# Patient Record
Sex: FEMALE | Race: WHITE | NOT HISPANIC OR LATINO | Employment: PART TIME | URBAN - METROPOLITAN AREA
[De-identification: names, ages, dates, MRNs, and addresses within clinical notes are randomized per-mention and may not be internally consistent; named-entity substitution may affect disease eponyms.]

---

## 2023-09-08 DIAGNOSIS — M25.561 RIGHT KNEE PAIN: Primary | ICD-10-CM

## 2023-09-12 ENCOUNTER — OFFICE VISIT (OUTPATIENT)
Dept: SPORTS MEDICINE | Facility: CLINIC | Age: 63
End: 2023-09-12
Payer: COMMERCIAL

## 2023-09-12 ENCOUNTER — HOSPITAL ENCOUNTER (OUTPATIENT)
Dept: RADIOLOGY | Facility: HOSPITAL | Age: 63
Discharge: HOME | End: 2023-09-12
Attending: ORTHOPAEDIC SURGERY
Payer: COMMERCIAL

## 2023-09-12 VITALS
WEIGHT: 141 LBS | BODY MASS INDEX: 26.62 KG/M2 | DIASTOLIC BLOOD PRESSURE: 92 MMHG | HEART RATE: 76 BPM | HEIGHT: 61 IN | SYSTOLIC BLOOD PRESSURE: 134 MMHG

## 2023-09-12 DIAGNOSIS — M17.11 ARTHRITIS OF RIGHT KNEE: Primary | ICD-10-CM

## 2023-09-12 DIAGNOSIS — M25.561 RIGHT KNEE PAIN, UNSPECIFIED CHRONICITY: ICD-10-CM

## 2023-09-12 PROCEDURE — 99203 PR OFFICE/OUTPT VISIT, NEW, LEVL III, 30-44 MIN: ICD-10-PCS | Mod: 25,S$GLB,, | Performed by: ORTHOPAEDIC SURGERY

## 2023-09-12 PROCEDURE — 20610 DRAIN/INJ JOINT/BURSA W/O US: CPT | Mod: RT,S$GLB,, | Performed by: ORTHOPAEDIC SURGERY

## 2023-09-12 PROCEDURE — 99203 OFFICE O/P NEW LOW 30 MIN: CPT | Mod: 25,S$GLB,, | Performed by: ORTHOPAEDIC SURGERY

## 2023-09-12 PROCEDURE — 99999 PR PBB SHADOW E&M-EST. PATIENT-LVL IV: CPT | Mod: PBBFAC,,, | Performed by: ORTHOPAEDIC SURGERY

## 2023-09-12 PROCEDURE — 20610 LARGE JOINT ASPIRATION/INJECTION: R KNEE: ICD-10-PCS | Mod: RT,S$GLB,, | Performed by: ORTHOPAEDIC SURGERY

## 2023-09-12 PROCEDURE — 73564 X-RAY EXAM KNEE 4 OR MORE: CPT | Mod: TC,50

## 2023-09-12 PROCEDURE — 99999 PR PBB SHADOW E&M-EST. PATIENT-LVL IV: ICD-10-PCS | Mod: PBBFAC,,, | Performed by: ORTHOPAEDIC SURGERY

## 2023-09-12 PROCEDURE — 73564 XR KNEE ORTHO BILAT WITH FLEXION: ICD-10-PCS | Mod: 26,50,, | Performed by: RADIOLOGY

## 2023-09-12 PROCEDURE — 73564 X-RAY EXAM KNEE 4 OR MORE: CPT | Mod: 26,50,, | Performed by: RADIOLOGY

## 2023-09-12 RX ORDER — NAPROXEN 500 MG/1
TABLET ORAL
COMMUNITY
Start: 2023-08-31

## 2023-09-12 RX ORDER — GABAPENTIN 300 MG/1
300 CAPSULE ORAL 2 TIMES DAILY
COMMUNITY
Start: 2023-08-21

## 2023-09-12 RX ORDER — CITALOPRAM 20 MG/1
20 TABLET, FILM COATED ORAL
COMMUNITY
Start: 2023-08-14

## 2023-09-12 RX ORDER — ACETAMINOPHEN AND CODEINE PHOSPHATE 300; 30 MG/1; MG/1
TABLET ORAL
COMMUNITY
Start: 2023-08-31

## 2023-09-12 RX ORDER — TRIAMCINOLONE ACETONIDE 40 MG/ML
60 INJECTION, SUSPENSION INTRA-ARTICULAR; INTRAMUSCULAR
Status: DISCONTINUED | OUTPATIENT
Start: 2023-09-12 | End: 2023-09-12 | Stop reason: HOSPADM

## 2023-09-12 RX ORDER — LOSARTAN POTASSIUM AND HYDROCHLOROTHIAZIDE 12.5; 1 MG/1; MG/1
1 TABLET ORAL
COMMUNITY
Start: 2023-06-29

## 2023-09-12 RX ADMIN — TRIAMCINOLONE ACETONIDE 60 MG: 40 INJECTION, SUSPENSION INTRA-ARTICULAR; INTRAMUSCULAR at 09:09

## 2023-09-12 NOTE — PROCEDURES
Large Joint Aspiration/Injection: R knee    Date/Time: 9/12/2023 9:15 AM    Performed by: Eliu Mackenzie MD  Authorized by: Eliu Mackenzie MD    Consent Done?:  Yes (Verbal)  Indications:  Pain  Site marked: the procedure site was marked    Timeout: prior to procedure the correct patient, procedure, and site was verified    Prep: patient was prepped and draped in usual sterile fashion      Details:  Needle Size:  22 G  Ultrasonic Guidance for needle placement?: No    Approach:  Superior  Location:  Knee  Site:  R knee  Medications:  60 mg triamcinolone acetonide 40 mg/mL  Patient tolerance:  Patient tolerated the procedure well with no immediate complications

## 2023-09-12 NOTE — PROGRESS NOTES
CC: Right knee pain    63 y.o. Female with a 6 years history of right knee atraumatic pain.  Patient is retired.   She states that the pain is severe and not responding to any conservative care.      She reports that the pain and weakness. It also bothers her at night. Pain with getting in and out of car.     No mechanical symptoms, No instability    Is affecting ADLs.  Pain is 7/10 at it's worst.    REVIEW OF SYSTEMS:   Constitution: Negative. Negative for chills, fever and night sweats.   HENT: Negative for congestion and headaches.    Eyes: Negative for blurred vision, left vision loss and right vision loss.   Cardiovascular: Negative for chest pain and syncope.   Respiratory: Negative for cough and shortness of breath.    Endocrine: Negative for polydipsia, polyphagia and polyuria.   Hematologic/Lymphatic: Negative for bleeding problem. Does not bruise/bleed easily.   Skin: Negative for dry skin, itching and rash.   Musculoskeletal: Negative for falls. Positive for right knee pain and  muscle weakness.   Gastrointestinal: Negative for abdominal pain and bowel incontinence.   Genitourinary: Negative for bladder incontinence and nocturia.   Neurological: Negative for disturbances in coordination, loss of balance and seizures.   Psychiatric/Behavioral: Negative for depression. The patient does not have insomnia.    Allergic/Immunologic: Negative for hives and persistent infections.     PAST MEDICAL HISTORY:   No past medical history on file.    PAST SURGICAL HISTORY:   No past surgical history on file.    FAMILY HISTORY:   No family history on file.    SOCIAL HISTORY:   Social History     Socioeconomic History    Marital status:    Tobacco Use    Smoking status: Every Day     Types: Cigarettes    Smokeless tobacco: Never       MEDICATIONS:     Current Outpatient Medications:     acetaminophen-codeine 300-30mg (TYLENOL #3) 300-30 mg Tab, , Disp: , Rfl:     citalopram (CELEXA) 20 MG tablet, Take 20 mg by  "mouth., Disp: , Rfl:     gabapentin (NEURONTIN) 300 MG capsule, Take 300 mg by mouth 2 (two) times daily., Disp: , Rfl:     losartan-hydrochlorothiazide 100-12.5 mg (HYZAAR) 100-12.5 mg Tab, Take 1 tablet by mouth., Disp: , Rfl:     naproxen (NAPROSYN) 500 MG tablet, , Disp: , Rfl:     ALLERGIES:   Review of patient's allergies indicates:  No Known Allergies    VITAL SIGNS:   BP (!) 134/92   Pulse 76   Ht 5' 1" (1.549 m)   Wt 64 kg (141 lb)   BMI 26.64 kg/m²      PHYSICAL EXAMINATION:  General:  The patient is alert and oriented x 3.  Mood is pleasant.  Observation of ears, eyes and nose reveal no gross abnormalities.  No labored breathing observed.    RIGHT KNEE EXAMINATION     OBSERVATION / INSPECTION   Gait:   Nonantalgic   Alignment:  Neutral   Scars:   None   Muscle atrophy: Mild  Effusion:  Mild  Warmth:  None   Discoloration:   none     TENDERNESS / CREPITUS (T / C):          T / C      T / C   Patella   - / -   Lateral joint line   - / -   Peripatellar medial  -  Medial joint line    + / -   Peripatellar lateral -  Medial plica   - / -   Patellar tendon -   Popliteal fossa  + / -   Quad tendon   -   Gastrocnemius   -   Prepatellar Bursa - / -   Quadricep   -   Tibial tubercle  -  Thigh/hamstring  -   Pes anserine/HS -  Fibula    -   ITB   - / -  Tibia     -   Tib/fib joint  - / -  LCL    -     MFC   - / -   MCL: Proximal  -    LFC   - / -    Distal   -          ROM: (* = pain)  PASSIVE   ACTIVE    Left :   5 / 0 / 145   5 / 0 / 145     Right :    5 / 0 / 135   5 / 0 / 120    Patellofemoral examination:  See above noted areas of tenderness.   Patella position    Subluxation / dislocation: Centered           Sup. / Inf;   Normal   Crepitus (PF):    Absent   Patellar Mobility:       Medial-lateral:   Normal    Superior-inferior:  Normal    Inferior tilt   Normal    Patellar tendon:  Normal   Lateral tilt:    Normal   J-sign:     None   Patellofemoral grind:   No pain       MENISCAL SIGNS:     Pain on " terminal extension:  -  Pain on terminal flexion:  +  Luisitos maneuver:  -   Squat     Not performed    LIGAMENT EXAMINATION:  ACL / Lachman:  normal (-1 to 2mm)    PCL-Post.  drawer: normal 0 to 2mm  MCL- Valgus:  normal 0 to 2mm  LCL- Varus:  normal 0 to 2mm  Pivot shift: normal (Equal)   Dial Test: difference c/w other side   At 30° flexion: normal (< 5°)    At 90° flexion: normal (< 5°)   Reverse Pivot Shift:   normal (Equal)     STRENGTH: (* = with pain) PAINFUL SIDE   Quadricep   5/5   Hamstrin/5    EXTREMITY NEURO-VASCULAR EXAMINATION:   Sensation:  Grossly intact to light touch all dermatomal regions.   Motor Function:  Fully intact motor function at hip, knee, foot and ankle    DTRs;  quadriceps and  achilles 2+.  No clonus and downgoing Babinski.    Vascular status:  DP and PT pulses 2+, brisk capillary refill, symmetric.     OTHER FINDINGS:  None    IMAGING:    X-rays including standing, weight bearing AP and flexion bilateral knees, lateral and merchant views ordered and images reviewed by me show:    No fracture, dislocation or other pathology   Medial compartment: Grade 3 changes   Lateral compartment: no degenerative changes   Patellofemoral compartment: Advanced lateral facet DJD   Varus producing arthritic changes     ASSESSMENT:    Right Knee Pain, arthritis  1. Right knee pain, unspecified chronicity    2. Arthritis of right knee        PLAN:   1. CSI R knee  2. Medial  brace  3. Tylenol  4. Referral for PT       All questions were answered, pt will contact us for questions or concerns in the interim.

## 2023-09-13 ENCOUNTER — TELEPHONE (OUTPATIENT)
Dept: SPORTS MEDICINE | Facility: CLINIC | Age: 63
End: 2023-09-13
Payer: COMMERCIAL

## 2023-09-13 NOTE — TELEPHONE ENCOUNTER
Returned patient call and LVM.     ----- Message from Heydi Fraga sent at 9/13/2023  8:27 AM CDT -----  Regarding: Walker questions  Contact: @ [phone]  Pt is wanting to know if the provider can give her a walker so that she can use it so that she doesn't fall when she's walking. Pt is asking to speak with someone today. Pt states that she is hard of hearing and is requesting a detailed vm letting her know. Please call pt to discuss further

## 2023-09-20 ENCOUNTER — TELEPHONE (OUTPATIENT)
Dept: SPORTS MEDICINE | Facility: CLINIC | Age: 63
End: 2023-09-20
Payer: COMMERCIAL

## 2023-09-20 NOTE — TELEPHONE ENCOUNTER
Patient will come get a walker/crutches for a short period usage.       ----- Message from Marisa Kaba sent at 9/20/2023 10:54 AM CDT -----  Pt calling in regards to wanting crutches, reclining bed and a walker , please call , leave  detailed voicemail if no answer pt doesn't hear well             Confirmed patient's contact info below:  Contact Name: Reji Mcfadden  Phone Number: [phone]  '

## 2023-09-21 DIAGNOSIS — M17.11 ARTHRITIS OF RIGHT KNEE: Primary | ICD-10-CM

## 2023-09-21 RX ORDER — DICLOFENAC SODIUM 10 MG/G
2 GEL TOPICAL 2 TIMES DAILY
Qty: 20 G | Refills: 1 | Status: SHIPPED | OUTPATIENT
Start: 2023-09-21

## 2023-09-21 NOTE — TELEPHONE ENCOUNTER
Voltaren gel presciption     ----- Message from Karlee Crum sent at 9/21/2023  8:12 AM CDT -----  Regarding: advise / rx inquiry  Contact: PT @ [phone]  Pt is calling asking to speak with someone in the office. Pt is asking about getting some medication or injections for her bone on bone arthritis; pt is also asking if acupuncture is a good way to go.    Pt states that she is in a lot of pain and looking for something to help her. Pt is asking if she needs to get in contact with her PCP or if Dr. Mackenzie is able to do injections for her. Please call to advise. Thanks.       [facility] [address] [place] [address], [place] - [address] [facility]  [address] [facility] [address]  Phone: [phone] Fax: [phone]

## 2023-12-07 ENCOUNTER — TELEPHONE (OUTPATIENT)
Dept: SPORTS MEDICINE | Facility: CLINIC | Age: 63
End: 2023-12-07
Payer: COMMERCIAL

## 2023-12-07 NOTE — TELEPHONE ENCOUNTER
----- Message from Marisa Kaba sent at 12/7/2023 12:45 PM CST -----  Regarding: injection  Pt calling to schedule her right   knee injection appt , please call     Confirmed patient's contact info below:  Contact Name: Reji Mcfadden  Phone Number: [phone]

## 2023-12-12 ENCOUNTER — OFFICE VISIT (OUTPATIENT)
Dept: SPORTS MEDICINE | Facility: CLINIC | Age: 63
End: 2023-12-12
Payer: COMMERCIAL

## 2023-12-12 VITALS
HEART RATE: 75 BPM | WEIGHT: 142.19 LBS | BODY MASS INDEX: 26.85 KG/M2 | HEIGHT: 61 IN | SYSTOLIC BLOOD PRESSURE: 125 MMHG | DIASTOLIC BLOOD PRESSURE: 85 MMHG

## 2023-12-12 DIAGNOSIS — M17.11 ARTHRITIS OF RIGHT KNEE: Primary | ICD-10-CM

## 2023-12-12 PROCEDURE — 99999 PR PBB SHADOW E&M-EST. PATIENT-LVL III: CPT | Mod: PBBFAC,,, | Performed by: ORTHOPAEDIC SURGERY

## 2023-12-12 PROCEDURE — 20610 DRAIN/INJ JOINT/BURSA W/O US: CPT | Mod: RT,S$GLB,, | Performed by: ORTHOPAEDIC SURGERY

## 2023-12-12 PROCEDURE — 99999 PR PBB SHADOW E&M-EST. PATIENT-LVL III: ICD-10-PCS | Mod: PBBFAC,,, | Performed by: ORTHOPAEDIC SURGERY

## 2023-12-12 PROCEDURE — 20610 LARGE JOINT ASPIRATION/INJECTION: R KNEE: ICD-10-PCS | Mod: RT,S$GLB,, | Performed by: ORTHOPAEDIC SURGERY

## 2023-12-12 PROCEDURE — 99214 PR OFFICE/OUTPT VISIT, EST, LEVL IV, 30-39 MIN: ICD-10-PCS | Mod: 25,S$GLB,, | Performed by: ORTHOPAEDIC SURGERY

## 2023-12-12 PROCEDURE — 99214 OFFICE O/P EST MOD 30 MIN: CPT | Mod: 25,S$GLB,, | Performed by: ORTHOPAEDIC SURGERY

## 2023-12-12 RX ORDER — TRIAMCINOLONE ACETONIDE 40 MG/ML
60 INJECTION, SUSPENSION INTRA-ARTICULAR; INTRAMUSCULAR
Status: DISCONTINUED | OUTPATIENT
Start: 2023-12-12 | End: 2023-12-12 | Stop reason: HOSPADM

## 2023-12-12 RX ADMIN — TRIAMCINOLONE ACETONIDE 60 MG: 40 INJECTION, SUSPENSION INTRA-ARTICULAR; INTRAMUSCULAR at 10:12

## 2023-12-12 NOTE — PROCEDURES
Large Joint Aspiration/Injection: R knee    Date/Time: 12/12/2023 10:15 AM    Performed by: Eliu Mackenzie MD  Authorized by: Eliu Mackenzie MD    Consent Done?:  Yes (Verbal)  Indications:  Pain  Site marked: the procedure site was marked    Timeout: prior to procedure the correct patient, procedure, and site was verified    Prep: patient was prepped and draped in usual sterile fashion      Details:  Needle Size:  22 G  Ultrasonic Guidance for needle placement?: No    Approach:  Superior  Location:  Knee  Site:  R knee  Medications:  60 mg triamcinolone acetonide 40 mg/mL  Aspirate amount (mL):  10  Aspirate:  Serous  Patient tolerance:  Patient tolerated the procedure well with no immediate complications

## 2023-12-12 NOTE — PROGRESS NOTES
CC: Right knee pain  Patient returns to clinic for follow up of right knee. She is requesting a repeat CSI today and notes moderate relief from the last injection.   No change in history since last visit. Patient has also been using the medial  brace.       Initial Hx:   63 y.o. Female with a 6 years history of right knee atraumatic pain.  Patient is retired.   She states that the pain is severe and not responding to any conservative care.      She reports that the pain and weakness. It also bothers her at night. Pain with getting in and out of car.     No mechanical symptoms, No instability    Is affecting ADLs.  Pain is 7/10 at it's worst.    REVIEW OF SYSTEMS:   Constitution: Negative. Negative for chills, fever and night sweats.   HENT: Negative for congestion and headaches.    Eyes: Negative for blurred vision, left vision loss and right vision loss.   Cardiovascular: Negative for chest pain and syncope.   Respiratory: Negative for cough and shortness of breath.    Endocrine: Negative for polydipsia, polyphagia and polyuria.   Hematologic/Lymphatic: Negative for bleeding problem. Does not bruise/bleed easily.   Skin: Negative for dry skin, itching and rash.   Musculoskeletal: Negative for falls. Positive for right knee pain and  muscle weakness.   Gastrointestinal: Negative for abdominal pain and bowel incontinence.   Genitourinary: Negative for bladder incontinence and nocturia.   Neurological: Negative for disturbances in coordination, loss of balance and seizures.   Psychiatric/Behavioral: Negative for depression. The patient does not have insomnia.    Allergic/Immunologic: Negative for hives and persistent infections.     PAST MEDICAL HISTORY:   History reviewed. No pertinent past medical history.    PAST SURGICAL HISTORY:   History reviewed. No pertinent surgical history.    FAMILY HISTORY:   History reviewed. No pertinent family history.    SOCIAL HISTORY:   Social History     Socioeconomic  "History    Marital status:    Tobacco Use    Smoking status: Every Day     Types: Cigarettes    Smokeless tobacco: Never       MEDICATIONS:     Current Outpatient Medications:     acetaminophen-codeine 300-30mg (TYLENOL #3) 300-30 mg Tab, , Disp: , Rfl:     citalopram (CELEXA) 20 MG tablet, Take 20 mg by mouth., Disp: , Rfl:     diclofenac sodium (VOLTAREN) 1 % Gel, Apply 2 g topically 2 (two) times daily., Disp: 20 g, Rfl: 1    gabapentin (NEURONTIN) 300 MG capsule, Take 300 mg by mouth 2 (two) times daily., Disp: , Rfl:     losartan-hydrochlorothiazide 100-12.5 mg (HYZAAR) 100-12.5 mg Tab, Take 1 tablet by mouth., Disp: , Rfl:     naproxen (NAPROSYN) 500 MG tablet, , Disp: , Rfl:     ALLERGIES:   Review of patient's allergies indicates:  No Known Allergies    VITAL SIGNS:   /85   Pulse 75   Ht 5' 1" (1.549 m)   Wt 64.5 kg (142 lb 3.2 oz)   BMI 26.87 kg/m²      PHYSICAL EXAMINATION:  General:  The patient is alert and oriented x 3.  Mood is pleasant.  Observation of ears, eyes and nose reveal no gross abnormalities.  No labored breathing observed.    RIGHT KNEE EXAMINATION     OBSERVATION / INSPECTION   Gait:   Nonantalgic   Alignment:  Neutral   Scars:   None   Muscle atrophy: Mild  Effusion:  Mild  Warmth:  None   Discoloration:   none     TENDERNESS / CREPITUS (T / C):          T / C      T / C   Patella   - / -   Lateral joint line   - / -   Peripatellar medial  -  Medial joint line    + / -   Peripatellar lateral -  Medial plica   - / -   Patellar tendon -   Popliteal fossa  + / -   Quad tendon   -   Gastrocnemius   -   Prepatellar Bursa - / -   Quadricep   -   Tibial tubercle  -  Thigh/hamstring  -   Pes anserine/HS -  Fibula    -   ITB   - / -  Tibia     -   Tib/fib joint  - / -  LCL    -     MFC   - / -   MCL: Proximal  -    LFC   - / -    Distal   -          ROM: (* = pain)  PASSIVE   ACTIVE    Left :   5 / 0 / 145   5 / 0 / 145     Right :    5 / 0 / 135   5 / 0 / 120    Patellofemoral " examination:  See above noted areas of tenderness.   Patella position    Subluxation / dislocation: Centered           Sup. / Inf;   Normal   Crepitus (PF):    Absent   Patellar Mobility:       Medial-lateral:   Normal    Superior-inferior:  Normal    Inferior tilt   Normal    Patellar tendon:  Normal   Lateral tilt:    Normal   J-sign:     None   Patellofemoral grind:   No pain       MENISCAL SIGNS:     Pain on terminal extension:  -  Pain on terminal flexion:  +  Luisitos maneuver:  -   Squat     Not performed    LIGAMENT EXAMINATION:  ACL / Lachman:  normal (-1 to 2mm)    PCL-Post.  drawer: normal 0 to 2mm  MCL- Valgus:  normal 0 to 2mm  LCL- Varus:  normal 0 to 2mm  Pivot shift: normal (Equal)   Dial Test: difference c/w other side   At 30° flexion: normal (< 5°)    At 90° flexion: normal (< 5°)   Reverse Pivot Shift:   normal (Equal)     STRENGTH: (* = with pain) PAINFUL SIDE   Quadricep   5/5   Hamstrin/5    EXTREMITY NEURO-VASCULAR EXAMINATION:   Sensation:  Grossly intact to light touch all dermatomal regions.   Motor Function:  Fully intact motor function at hip, knee, foot and ankle    DTRs;  quadriceps and  achilles 2+.  No clonus and downgoing Babinski.    Vascular status:  DP and PT pulses 2+, brisk capillary refill, symmetric.     OTHER FINDINGS:  None    IMAGING:    X-rays including standing, weight bearing AP and flexion bilateral knees, lateral and merchant views ordered and images reviewed by me show:    No fracture, dislocation or other pathology   Medial compartment: Grade 3 changes   Lateral compartment: no degenerative changes   Patellofemoral compartment: Advanced lateral facet DJD   Varus producing arthritic changes     ASSESSMENT:    Right Knee Pain, arthritis  1. Arthritis of right knee        PLAN:   1. CSI R knee + 10 cc collected   2. F/u PRN       All questions were answered, pt will contact us for questions or concerns in the interim.

## 2024-03-08 ENCOUNTER — TELEPHONE (OUTPATIENT)
Dept: SPORTS MEDICINE | Facility: CLINIC | Age: 64
End: 2024-03-08
Payer: COMMERCIAL

## 2024-03-08 NOTE — TELEPHONE ENCOUNTER
LVM appt scheduled for 3/12/24 at 330----- Message from Davey Rodriguez sent at 3/8/2024  1:51 PM CST -----  Regarding: FW: Appt  Contact: Pt  [phone]  Can you call her and schedule her for a CSI?     ----- Message -----  From: Davey Rodriguez  Sent: 3/8/2024   1:03 PM CST  To: Davey Rodriguez  Subject: FW: Appt                                           ----- Message -----  From: Joel Mendoza  Sent: 3/8/2024   9:24 AM CST  To: Gurdeep TURNER Staff  Subject: Appt                                             Pt calling regarding appt for right knee pain. Would like to discuss injections and schedule appt. Attempted to schedule. Please call  [phone]

## 2024-03-13 NOTE — PROGRESS NOTES
CC: Right knee pain    Patient returns to clinic today for follow-up evaluation of right knee pain/osteoarthritis.  Last seen by Dr. Mackenzie approximately 3 months ago for the same issue.  She has received corticosteroid injections in the past which typically help with pain.  Her knee pain is typically worse with getting in and out of a car, going up and down stairs, and bending the right knee. She reports increased swelling and pain that began 2-3 weeks ago.  No inciting injury or trauma to the right knee.  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  She continues to wear a medial  brace.  Most of her pain is localized to the medial aspect of the right knee.  Requesting a corticosteroid injection today to help with pain.    No mechanical symptoms, No instability    Is affecting ADLs.  Pain is 7/10 at it's worst.    REVIEW OF SYSTEMS:   Constitution: Negative. Negative for chills, fever and night sweats.   HENT: Negative for congestion and headaches.    Eyes: Negative for blurred vision, left vision loss and right vision loss.   Cardiovascular: Negative for chest pain and syncope.   Respiratory: Negative for cough and shortness of breath.    Endocrine: Negative for polydipsia, polyphagia and polyuria.   Hematologic/Lymphatic: Negative for bleeding problem. Does not bruise/bleed easily.   Skin: Negative for dry skin, itching and rash.   Musculoskeletal: Negative for falls. Positive for right knee pain and  muscle weakness.   Gastrointestinal: Negative for abdominal pain and bowel incontinence.   Genitourinary: Negative for bladder incontinence and nocturia.   Neurological: Negative for disturbances in coordination, loss of balance and seizures.   Psychiatric/Behavioral: Negative for depression. The patient does not have insomnia.    Allergic/Immunologic: Negative for hives and persistent infections.     PAST MEDICAL HISTORY:   History reviewed. No  "pertinent past medical history.    PAST SURGICAL HISTORY:   History reviewed. No pertinent surgical history.    FAMILY HISTORY:   History reviewed. No pertinent family history.    SOCIAL HISTORY:   Social History     Socioeconomic History    Marital status:    Tobacco Use    Smoking status: Every Day     Types: Cigarettes    Smokeless tobacco: Never       MEDICATIONS:     Current Outpatient Medications:     acetaminophen-codeine 300-30mg (TYLENOL #3) 300-30 mg Tab, , Disp: , Rfl:     citalopram (CELEXA) 20 MG tablet, Take 20 mg by mouth., Disp: , Rfl:     diclofenac sodium (VOLTAREN) 1 % Gel, Apply 2 g topically 2 (two) times daily., Disp: 20 g, Rfl: 1    gabapentin (NEURONTIN) 300 MG capsule, Take 300 mg by mouth 2 (two) times daily., Disp: , Rfl:     losartan-hydrochlorothiazide 100-12.5 mg (HYZAAR) 100-12.5 mg Tab, Take 1 tablet by mouth., Disp: , Rfl:     naproxen (NAPROSYN) 500 MG tablet, , Disp: , Rfl:     ALLERGIES:   Review of patient's allergies indicates:  No Known Allergies    VITAL SIGNS:   BP (!) 145/77   Pulse 84   Ht 5' 1" (1.549 m)   Wt 59 kg (130 lb 1.1 oz)   BMI 24.58 kg/m²      PHYSICAL EXAMINATION:  General:  The patient is alert and oriented x 3.  Mood is pleasant.  Observation of ears, eyes and nose reveal no gross abnormalities.  No labored breathing observed.    RIGHT KNEE EXAMINATION     OBSERVATION / INSPECTION   Gait:   Nonantalgic   Alignment:  Neutral   Scars:   None   Muscle atrophy: Mild  Effusion:  Mild  Warmth:  None   Discoloration:   none     TENDERNESS / CREPITUS (T / C):          T / C      T / C   Patella   - / -   Lateral joint line   - / -   Peripatellar medial  -  Medial joint line    + / -   Peripatellar lateral -  Medial plica   - / -   Patellar tendon -   Popliteal fossa   + / -   Quad tendon   -   Gastrocnemius   -   Prepatellar Bursa - / -   Quadricep   -   Tibial tubercle  -  Thigh/hamstring  -   Pes anserine/HS -  Fibula    -   ITB   - / -  Tibia "     -   Tib/fib joint  - / -  LCL    -     MFC   - / -   MCL: Proximal  -    LFC   - / -    Distal   -          ROM: (* = pain)  PASSIVE   ACTIVE    Left :   5 / 0 / 145   5 / 0 / 145     Right :    5 / 5* / 125*   5 / 5* / 120*    Patellofemoral examination:  See above noted areas of tenderness.   Patella position    Subluxation / dislocation: Centered           Sup. / Inf;   Normal   Crepitus (PF):    +  Patellar Mobility:       Medial-lateral:   Normal    Superior-inferior:  Normal    Inferior tilt   Normal    Patellar tendon:  Normal   Lateral tilt:    Normal   J-sign:     None   Patellofemoral grind:   No pain       MENISCAL SIGNS:     Pain on terminal extension:  +  Pain on terminal flexion:  +  Luisitos maneuver:  -   Squat     Not performed    LIGAMENT EXAMINATION:  ACL / Lachman:  normal (-1 to 2mm)    PCL-Post.  drawer: normal 0 to 2mm  MCL- Valgus:  normal 0 to 2mm  LCL- Varus:  normal 0 to 2mm  Pivot shift: normal (Equal)   Dial Test: difference c/w other side   At 30° flexion: normal (< 5°)    At 90° flexion: normal (< 5°)   Reverse Pivot Shift:   normal (Equal)     STRENGTH: (* = with pain) PAINFUL SIDE   Quadricep   4+/5   Hamstrin+/5    EXTREMITY NEURO-VASCULAR EXAMINATION:   Sensation:  Grossly intact to light touch all dermatomal regions.   Motor Function:  Fully intact motor function at hip, knee, foot and ankle    DTRs;  quadriceps and  achilles 2+.  No clonus and downgoing Babinski.    Vascular status:  DP and PT pulses 2+, brisk capillary refill, symmetric.     OTHER FINDINGS:  None    X-rays bilateral knees (2023):    X-rays including standing, weight bearing AP and flexion bilateral knees, lateral and merchant views ordered and images reviewed by me show:   No acute fracture or dislocation.  Moderate to severe medial tibiofemoral and patellofemoral DJD.  Soft tissues appear normal.    Kellgren Elvis grade 3     ASSESSMENT:    Chronic right knee pain  Primary  osteoarthritis of right knee      PLAN:     Prior imaging reviewed and discussed with patient in detail.    We discussed at length different treatment options including conservative vs surgical management. These include anti-inflammatories, acetaminophen, rest, ice, heat, formal physical therapy including strengthening and stretching exercises, home exercise programs, dry needling, corticosteroid versus viscosupplementation injections, and finally surgical intervention.      10 cc of normal joint fluid aspirated from the right knee followed by therapeutic corticosteroid injection performed today.  See procedure note for details.    Recommend she continue to rest, ice, and elevate the right knee and take over-the-counter anti-inflammatories / acetaminophen as needed for pain.  Continue wearing  brace.    Follow-up in 3-4 months.      All questions were answered, pt will contact us for questions or concerns in the interim.        Medical Dictation software was used during the dictation of portions or the entirety of this medical record.  Phonetic or grammatic errors may exist due to the use of this software. For clarification, refer to the author of the document.

## 2024-03-14 ENCOUNTER — OFFICE VISIT (OUTPATIENT)
Dept: SPORTS MEDICINE | Facility: CLINIC | Age: 64
End: 2024-03-14
Payer: COMMERCIAL

## 2024-03-14 VITALS
HEART RATE: 84 BPM | DIASTOLIC BLOOD PRESSURE: 77 MMHG | BODY MASS INDEX: 24.55 KG/M2 | SYSTOLIC BLOOD PRESSURE: 145 MMHG | WEIGHT: 130.06 LBS | HEIGHT: 61 IN

## 2024-03-14 DIAGNOSIS — M17.11 PRIMARY OSTEOARTHRITIS OF RIGHT KNEE: Primary | ICD-10-CM

## 2024-03-14 DIAGNOSIS — M25.561 CHRONIC PAIN OF RIGHT KNEE: ICD-10-CM

## 2024-03-14 DIAGNOSIS — G89.29 CHRONIC PAIN OF RIGHT KNEE: ICD-10-CM

## 2024-03-14 PROCEDURE — 99999 PR PBB SHADOW E&M-EST. PATIENT-LVL III: CPT | Mod: PBBFAC,,, | Performed by: PHYSICIAN ASSISTANT

## 2024-03-14 PROCEDURE — 99214 OFFICE O/P EST MOD 30 MIN: CPT | Mod: 25,S$GLB,, | Performed by: PHYSICIAN ASSISTANT

## 2024-03-14 PROCEDURE — 20610 DRAIN/INJ JOINT/BURSA W/O US: CPT | Mod: RT,S$GLB,, | Performed by: PHYSICIAN ASSISTANT

## 2024-03-14 RX ORDER — TRIAMCINOLONE ACETONIDE 40 MG/ML
40 INJECTION, SUSPENSION INTRA-ARTICULAR; INTRAMUSCULAR
Status: DISCONTINUED | OUTPATIENT
Start: 2024-03-14 | End: 2024-03-14 | Stop reason: HOSPADM

## 2024-03-14 RX ADMIN — TRIAMCINOLONE ACETONIDE 40 MG: 40 INJECTION, SUSPENSION INTRA-ARTICULAR; INTRAMUSCULAR at 09:03

## 2024-03-14 NOTE — PROCEDURES
Large Joint Aspiration/Injection: R knee    Date/Time: 3/14/2024 9:00 AM    Performed by: Marco Metz PA-C  Authorized by: Marco Metz PA-C    Consent Done?:  Yes (Verbal)  Indications:  Joint swelling  Site marked: the procedure site was marked    Timeout: prior to procedure the correct patient, procedure, and site was verified    Prep: patient was prepped and draped in usual sterile fashion    Local anesthesia used?: No      Details:  Needle Size:  22 G  Ultrasonic Guidance for needle placement?: No    Approach:  Superior  Location:  Knee  Site:  R knee  Medications:  40 mg triamcinolone acetonide 40 mg/mL  Aspirate amount (mL):  10  Aspirate:  Serous  Patient tolerance:  Patient tolerated the procedure well with no immediate complications    Aspiration/Injection Procedure    A time out was performed, including verification of patient ID, procedure, site and side, availability of information and equipment, review of safety issues, and agreement with consent, the procedure site was marked.    Aspiration:  After time out was performed, the patient was prepped aseptically with povidone-iodine swabsticks. 10cc's of normal joint fluid was aspirated from the Superolateral  aspect of the right Knee Joint using a 22g x 1.5 needle in sterile fashion without complication.     Injection:  Following aspiration, a diagnostic and therapeutic injection of 1:3cc Kenalog/Marcaine was given under sterile technique in the supine position.     Reji Mcfadden had no adverse reactions to the medication. Pain decreased. She was instructed to apply ice to the joint for 20 minutes and avoid strenuous activities for 24-36 hours following the injection. She was warned of possible blood sugar and/or blood pressure changes during that time. Following that time, she can resume regular activities.

## 2024-04-16 ENCOUNTER — TELEPHONE (OUTPATIENT)
Dept: SPORTS MEDICINE | Facility: CLINIC | Age: 64
End: 2024-04-16
Payer: COMMERCIAL

## 2024-04-16 DIAGNOSIS — M21.161 ACQUIRED GENU VARUM OF RIGHT LOWER EXTREMITY: ICD-10-CM

## 2024-04-16 DIAGNOSIS — M17.11 PRIMARY OSTEOARTHRITIS OF RIGHT KNEE: Primary | ICD-10-CM

## 2024-04-16 NOTE — TELEPHONE ENCOUNTER
Called and spoke with patient to let her know that a order was put in and that someone will her once it's ready to be .    ----- Message from Marco Metz PA-C sent at 4/16/2024  9:55 AM CDT -----  Regarding: FW: pt advice  Contact: pt [phone]  Can you call her and let her know we put in another order for her knee brace and we will call her when it is ready for pickup?  ----- Message -----  From: Ángela Mendoza  Sent: 4/16/2024   9:30 AM CDT  To: [facility] Staff  Subject: pt advice                                        Pt calling to request a prescription /order for another knee brace. Pls call

## 2024-05-29 ENCOUNTER — TELEPHONE (OUTPATIENT)
Dept: SPORTS MEDICINE | Facility: CLINIC | Age: 64
End: 2024-05-29
Payer: COMMERCIAL

## 2024-05-29 NOTE — TELEPHONE ENCOUNTER
Spoke with pt, pt states she is unable to walk and right knee is very painful. Pt has been scheduled to see Dr Mackenzie on 5/30/24

## 2024-05-29 NOTE — TELEPHONE ENCOUNTER
----- Message from Marisa Kaba sent at 5/29/2024 10:23 AM CDT -----  Pt calling to schedule  Gel injection lina for knee, says she has fluid in her knee     Confirmed patient's contact info below:  Contact Name: Reji Mcfadden  Phone Number: [phone]'

## 2024-06-05 ENCOUNTER — TELEPHONE (OUTPATIENT)
Dept: SPORTS MEDICINE | Facility: CLINIC | Age: 64
End: 2024-06-05
Payer: COMMERCIAL

## 2024-06-05 DIAGNOSIS — M17.11 PRIMARY OSTEOARTHRITIS OF RIGHT KNEE: Primary | ICD-10-CM

## 2024-06-05 NOTE — TELEPHONE ENCOUNTER
Visco Injection series order.     ----- Message from Luis Ceja MA sent at 6/5/2024  9:55 AM CDT -----  Regarding: appt request  Contact: pt at  [phone]  Pt is calling to speak with someone in provider office is asking for a return call regarding an appt  for a  gel shot in her  right  knee please call pt at  [phone]

## 2024-06-19 ENCOUNTER — TELEPHONE (OUTPATIENT)
Dept: SPORTS MEDICINE | Facility: CLINIC | Age: 64
End: 2024-06-19
Payer: COMMERCIAL

## 2024-06-19 DIAGNOSIS — M17.11 PRIMARY OSTEOARTHRITIS OF RIGHT KNEE: Primary | ICD-10-CM

## 2024-06-26 ENCOUNTER — TELEPHONE (OUTPATIENT)
Dept: SPORTS MEDICINE | Facility: CLINIC | Age: 64
End: 2024-06-26
Payer: COMMERCIAL

## 2024-06-27 ENCOUNTER — OFFICE VISIT (OUTPATIENT)
Dept: SPORTS MEDICINE | Facility: CLINIC | Age: 64
End: 2024-06-27
Payer: COMMERCIAL

## 2024-06-27 ENCOUNTER — TELEPHONE (OUTPATIENT)
Dept: SPORTS MEDICINE | Facility: CLINIC | Age: 64
End: 2024-06-27

## 2024-06-27 VITALS
SYSTOLIC BLOOD PRESSURE: 131 MMHG | BODY MASS INDEX: 25.3 KG/M2 | WEIGHT: 134 LBS | DIASTOLIC BLOOD PRESSURE: 78 MMHG | HEART RATE: 81 BPM | HEIGHT: 61 IN

## 2024-06-27 DIAGNOSIS — M17.11 PRIMARY OSTEOARTHRITIS OF RIGHT KNEE: Primary | ICD-10-CM

## 2024-06-27 PROCEDURE — 99999 PR PBB SHADOW E&M-EST. PATIENT-LVL III: CPT | Mod: PBBFAC,,, | Performed by: ORTHOPAEDIC SURGERY

## 2024-06-27 NOTE — PROGRESS NOTES
Patient is here for follow up of right knee arthritis. Pt is requesting Orthovisc injection #1.    Patient has painful DJD of right knee and has failed other conservative modalities including NSAIDS, activity modification, weight loss and rehabilitation exercises.      Patient has received good relief with these injections in the past.      The prior shots were tolerated well.  RADIOGRAPHIC IMAGING:   Kellgren-Elvis Grade 2-3.   PHYSICAL EXAMINATION:      General: The patient is alert and oriented x 3. Mood is pleasant.   Observation of ears, eyes and nose reveals no gross abnormalities. No   labored breathing observed.      No signs of infection or adverse reaction to Right knee.      Orthovisc Injection Procedure #1     A time out was performed, including verification of patient ID, procedure, site and side, availability of information and equipment, review of safety issues, and agreement with consent, the procedure site was marked.     The patient was prepped aseptically with povidone-iodine swabsticks. A diagnostic and therapeutic injection of 2cc Orthovisc   was given under sterile technique using a 21.5g x 1.5 needle from the superolateral aspect of the right knee joint in the supine position.       she had no adverse reactions to the medication. Pain decreased. she   was instructed to apply ice to the joint for 20 minutes and avoid strenuous activities for 24-36 hours following the injection. she   was warned of possible blood pressure changes during that time. Following that time, she can resume activities as prior to the injection.     she was reminded to call the clinic immediately for any adverse side effects as explained in clinic today.     Lot: [id number]  Exp: 03/07/206

## 2024-06-27 NOTE — PROCEDURES
Large Joint Aspiration/Injection: R knee    Date/Time: 6/27/2024 10:30 AM    Performed by: Eliu Mackenzie MD  Authorized by: Eliu Mackenzie MD    Consent Done?:  Yes (Verbal)  Indications:  Pain  Site marked: the procedure site was marked    Timeout: prior to procedure the correct patient, procedure, and site was verified    Prep: patient was prepped and draped in usual sterile fashion      Details:  Needle Size:  22 G  Ultrasonic Guidance for needle placement?: No    Approach:  Superior  Location:  Knee  Site:  R knee  Medications:  30 mg sodium hyaluronate (orthovisc) 30 mg/2 mL  Patient tolerance:  Patient tolerated the procedure well with no immediate complications

## 2024-06-27 NOTE — TELEPHONE ENCOUNTER
----- Message from Karla Foster sent at 6/27/2024  3:20 PM CDT -----  Regarding: Appt  Contact: donovan [phone]  Pt is schedule for injection 7/2 @ 3, pt is requesting to come in the morning of this date, please call pt @[phone]

## 2024-07-02 ENCOUNTER — OFFICE VISIT (OUTPATIENT)
Dept: SPORTS MEDICINE | Facility: CLINIC | Age: 64
End: 2024-07-02
Payer: COMMERCIAL

## 2024-07-02 VITALS
DIASTOLIC BLOOD PRESSURE: 77 MMHG | HEIGHT: 61 IN | BODY MASS INDEX: 25.37 KG/M2 | WEIGHT: 134.38 LBS | SYSTOLIC BLOOD PRESSURE: 132 MMHG | HEART RATE: 80 BPM

## 2024-07-02 DIAGNOSIS — M17.11 PRIMARY OSTEOARTHRITIS OF RIGHT KNEE: Primary | ICD-10-CM

## 2024-07-02 PROCEDURE — 99999 PR PBB SHADOW E&M-EST. PATIENT-LVL III: CPT | Mod: PBBFAC,,, | Performed by: ORTHOPAEDIC SURGERY

## 2024-07-02 NOTE — PROCEDURES
Large Joint Aspiration/Injection: R knee    Date/Time: 7/2/2024 3:30 PM    Performed by: Eliu Mackenzie MD  Authorized by: Eliu Mackenzie MD    Consent Done?:  Yes (Verbal)  Indications:  Pain  Site marked: the procedure site was marked    Timeout: prior to procedure the correct patient, procedure, and site was verified    Prep: patient was prepped and draped in usual sterile fashion      Details:  Needle Size:  22 G  Ultrasonic Guidance for needle placement?: No    Approach:  Superior  Location:  Knee  Site:  R knee  Medications:  30 mg sodium hyaluronate (orthovisc) 30 mg/2 mL  Patient tolerance:  Patient tolerated the procedure well with no immediate complications

## 2024-07-02 NOTE — PROGRESS NOTES
Patient is here for follow up of right knee arthritis. Pt is requesting Orthovisc injection #2.    Patient has painful DJD of right knee and has failed other conservative modalities including NSAIDS, activity modification, weight loss and rehabilitation exercises.      Patient has received good relief with these injections in the past.      The prior shots were tolerated well.  RADIOGRAPHIC IMAGING:   Kellgren-Elvis Grade 2-3.   PHYSICAL EXAMINATION:      General: The patient is alert and oriented x 3. Mood is pleasant.   Observation of ears, eyes and nose reveals no gross abnormalities. No   labored breathing observed.      No signs of infection or adverse reaction to Right knee.      Orthovisc Injection Procedure #2     A time out was performed, including verification of patient ID, procedure, site and side, availability of information and equipment, review of safety issues, and agreement with consent, the procedure site was marked.     The patient was prepped aseptically with povidone-iodine swabsticks. A diagnostic and therapeutic injection of 2cc Orthovisc   was given under sterile technique using a 21.5g x 1.5 needle from the superolateral aspect of the right knee joint in the supine position.       she had no adverse reactions to the medication. Pain decreased. she   was instructed to apply ice to the joint for 20 minutes and avoid strenuous activities for 24-36 hours following the injection. she   was warned of possible blood pressure changes during that time. Following that time, she can resume activities as prior to the injection.     she was reminded to call the clinic immediately for any adverse side effects as explained in clinic today.     Lot: [id number]  Exp: 03/07/206

## 2024-07-09 ENCOUNTER — OFFICE VISIT (OUTPATIENT)
Dept: SPORTS MEDICINE | Facility: CLINIC | Age: 64
End: 2024-07-09
Payer: COMMERCIAL

## 2024-07-09 VITALS
BODY MASS INDEX: 25.02 KG/M2 | WEIGHT: 132.5 LBS | HEIGHT: 61 IN | HEART RATE: 77 BPM | SYSTOLIC BLOOD PRESSURE: 103 MMHG | DIASTOLIC BLOOD PRESSURE: 61 MMHG

## 2024-07-09 DIAGNOSIS — M17.11 PRIMARY OSTEOARTHRITIS OF RIGHT KNEE: Primary | ICD-10-CM

## 2024-07-09 PROCEDURE — 20610 DRAIN/INJ JOINT/BURSA W/O US: CPT | Mod: RT,S$GLB,, | Performed by: PHYSICIAN ASSISTANT

## 2024-07-09 PROCEDURE — 99499 UNLISTED E&M SERVICE: CPT | Mod: 25,S$GLB,, | Performed by: PHYSICIAN ASSISTANT

## 2024-07-09 PROCEDURE — 99999 PR PBB SHADOW E&M-EST. PATIENT-LVL III: CPT | Mod: PBBFAC,,, | Performed by: PHYSICIAN ASSISTANT

## 2024-07-09 NOTE — PROCEDURES
Large Joint Aspiration/Injection: R knee    Date/Time: 7/9/2024 10:15 AM    Performed by: Marco Metz PA-C  Authorized by: Marco Metz PA-C    Consent Done?:  Yes (Verbal)  Indications:  Pain and arthritis  Site marked: the procedure site was marked    Timeout: prior to procedure the correct patient, procedure, and site was verified    Prep: patient was prepped and draped in usual sterile fashion    Local anesthesia used?: No      Details:  Needle Size:  22 G  Ultrasonic Guidance for needle placement?: No    Approach:  Superior  Location:  Knee  Site:  R knee  Medications:  30 mg sodium hyaluronate (orthovisc) 30 mg/2 mL  Patient tolerance:  Patient tolerated the procedure well with no immediate complications

## 2024-07-09 NOTE — PROGRESS NOTES
Patient is here for follow up of right knee arthritis. Pt is requesting Orthovisc injection #3.      Patient has painful DJD of right knee and has failed other conservative modalities including NSAIDS, activity modification, weight loss and rehabilitation exercises.      Patient has received good relief with these injections in the past.      The prior shots were tolerated well.    RADIOGRAPHIC IMAGING:   Kellgren-Elvis Grade 2-3.     PHYSICAL EXAMINATION:      General: The patient is alert and oriented x 3. Mood is pleasant.   Observation of ears, eyes and nose reveals no gross abnormalities. No   labored breathing observed.      No signs of infection or adverse reaction to Right knee.      Orthovisc Injection Procedure #3     A time out was performed, including verification of patient ID, procedure, site and side, availability of information and equipment, review of safety issues, and agreement with consent, the procedure site was marked.     The patient was prepped aseptically with povidone-iodine swabsticks. A diagnostic and therapeutic injection of 2cc Orthovisc   was given under sterile technique using a 22g x 1.5 needle from the superolateral aspect of the right knee joint in the supine position.       she had no adverse reactions to the medication. Pain decreased. she was instructed to apply ice to the joint for 20 minutes and avoid strenuous activities for 24-36 hours following the injection. she was warned of possible blood pressure changes during that time. Following that time, she can resume activities as prior to the injection.     she was reminded to call the clinic immediately for any adverse side effects as explained in clinic today.     Lot: [id number]  Exp: 03/07/206

## 2024-07-17 ENCOUNTER — TELEPHONE (OUTPATIENT)
Dept: SPORTS MEDICINE | Facility: CLINIC | Age: 64
End: 2024-07-17
Payer: COMMERCIAL

## 2024-07-17 NOTE — TELEPHONE ENCOUNTER
Spoke to patient and sched appt to come in tomorrow. She wants to know what he can do for her. I let her know he will need to examine her----- Message from Kathryn Fontanez sent at 7/17/2024  9:37 AM CDT -----  Regarding: Reaction to the injection  Contact: [phone]  Type:  Needs Medical Advice    Who Called: Rise  Symptoms (please be specific): Under the right knee, discoloration and swelling up to the icw   How long has patient had these symptoms:  Over a week    Would the patient rather a call back or a response via [facility]? Call back  Best Call Back Number: [phone]    Additional Information:

## 2024-07-23 ENCOUNTER — TELEPHONE (OUTPATIENT)
Dept: SPORTS MEDICINE | Facility: CLINIC | Age: 64
End: 2024-07-23
Payer: COMMERCIAL

## 2024-07-23 NOTE — TELEPHONE ENCOUNTER
Called and spoke patient regarding pain and swelling of the right knee.  She last saw me on July 9th where she received her 3rd and final Orthovisc injection.  Patient states that over the past 2 days symptoms seem to be gradually improving.  She has been taking meloxicam, resting, icing the knee, and wearing a compression sleeve.  She would like to continue conservative treatment at home for now and canceled her appointment today.  Advised her to call us should symptoms worsen or fail to improve.  She verbalized understanding of the above and all questions were answered.

## 2024-12-13 ENCOUNTER — TELEPHONE (OUTPATIENT)
Dept: SPORTS MEDICINE | Facility: CLINIC | Age: 64
End: 2024-12-13
Payer: COMMERCIAL

## 2024-12-13 NOTE — TELEPHONE ENCOUNTER
----- Message from Rawlemon sent at 12/12/2024  5:04 PM CST -----   Name of Who is Calling:     What is the request in detail:  patient request call back in reference schedule  knee injection Please contact to further discuss and advise      Can the clinic reply by [facility]:     What Number to Call Back if not in [facility]:   [phone]

## 2025-02-21 NOTE — TELEPHONE ENCOUNTER
----- Message from Nanci Lauren sent at 6/26/2024  7:53 AM CDT -----  SHELLY OLIVIER calling regarding Patient Advice for wanting to inform that she received a letter from her insurance stating she was denied for the knee injection that is set for tomorrow, but she is still scheduled and wanted to confirm if this letter was accurate or not, call back to inform [phone]  
Spoke in pt, informed pt that we have in our system that opt is authorized to have knee injections done  
trauma consult

## 2025-03-17 ENCOUNTER — TELEPHONE (OUTPATIENT)
Dept: SPORTS MEDICINE | Facility: CLINIC | Age: 65
End: 2025-03-17
Payer: COMMERCIAL

## 2025-03-17 NOTE — TELEPHONE ENCOUNTER
----- Message from Med Assistant Umana sent at 3/17/2025  1:08 PM CDT -----  Regarding: FW: Pt advice  Contact: pt [phone]    ----- Message -----  From: Luis Ceja MA  Sent: 3/17/2025   1:01 PM CDT  To: [facility] Staff  Subject: Pt advice                                        Type:  Needs Medical Advice appt  injection Who Called: Mr. Mcfadden and  rep from [facility]  call to  update patient insurance also called to  get patient  scheduled for knee injection  but is not sure if patient needs a PA for the injection Would the patient rather a call back or a response via [facility]?  Call back Best Call Back Number: pt [phone] Additional Information:

## 2025-06-24 DIAGNOSIS — M17.10 LOCALIZED PRIMARY OSTEOARTHRITIS OF LOWER LEG: Primary | ICD-10-CM

## 2025-07-24 ENCOUNTER — TELEPHONE (OUTPATIENT)
Dept: SPORTS MEDICINE | Facility: CLINIC | Age: 65
End: 2025-07-24
Payer: COMMERCIAL

## 2025-07-24 NOTE — TELEPHONE ENCOUNTER
Spoke to patient about her want for a new knee  brace. Told that her insurance has denied this as they are requesting she been seen for a recent visit before they approve one. Patient made aware and appointment scheduled.       Copied from CRM #[id number]. Topic: General Inquiry - Patient Advice  >> Jul 24, 2025 11:37 AM Sagrario wrote:  Type:  Needs Medical Advice    Who Called: Rise  Symptoms (please be specific): broken leg brace   Would the patient rather a call back or a response via [facility]? call  Best Call Back Number: [phone]  Additional Information: Requesting order for right knee brace([place])